# Patient Record
Sex: MALE | Race: OTHER | Employment: UNEMPLOYED | URBAN - NONMETROPOLITAN AREA
[De-identification: names, ages, dates, MRNs, and addresses within clinical notes are randomized per-mention and may not be internally consistent; named-entity substitution may affect disease eponyms.]

---

## 2023-12-01 ENCOUNTER — HOSPITAL ENCOUNTER (OUTPATIENT)
Age: 28
Discharge: HOME | End: 2023-12-01
Payer: MEDICAID

## 2023-12-01 PROCEDURE — 86480 TB TEST CELL IMMUN MEASURE: CPT

## 2023-12-05 LAB
GAMMA INTERFERON BACKGROUND BLD IA-ACNC: 0.53 IU/ML
M TB IFN-G BLD-IMP: POSITIVE
M TB IFN-G CD4+ BCKGRND COR BLD-ACNC: 2.56 IU/ML (ref 0–0.34)
M TB IFN-G CD4+CD8+ BCKGRND COR BLD-ACNC: 3.42 IU/ML (ref 0–0.34)
MITOGEN IGNF BCKGRD COR BLD-ACNC: > 10 IU/ML

## 2023-12-12 ENCOUNTER — HOSPITAL ENCOUNTER (OUTPATIENT)
Age: 28
Discharge: HOME | End: 2023-12-12
Payer: MEDICAID

## 2023-12-12 ENCOUNTER — HOSPITAL ENCOUNTER (OUTPATIENT)
Dept: GENERAL RADIOLOGY | Age: 28
Discharge: HOME | End: 2023-12-12
Payer: MEDICAID

## 2023-12-12 DIAGNOSIS — Z20.1 TUBERCULOSIS EXPOSURE: ICD-10-CM

## 2023-12-12 PROCEDURE — 71045 X-RAY EXAM CHEST 1 VIEW: CPT

## 2023-12-20 ENCOUNTER — OFFICE VISIT (OUTPATIENT)
Dept: FAMILY MEDICINE CLINIC | Age: 28
End: 2023-12-20
Payer: MEDICAID

## 2023-12-20 VITALS
OXYGEN SATURATION: 98 % | HEIGHT: 70 IN | HEART RATE: 71 BPM | TEMPERATURE: 98.2 F | RESPIRATION RATE: 12 BRPM | DIASTOLIC BLOOD PRESSURE: 86 MMHG | SYSTOLIC BLOOD PRESSURE: 128 MMHG | BODY MASS INDEX: 22.05 KG/M2 | WEIGHT: 154 LBS

## 2023-12-20 DIAGNOSIS — Z79.899 HIGH RISK MEDICATION USE: ICD-10-CM

## 2023-12-20 DIAGNOSIS — Z11.4 ENCOUNTER FOR SCREENING FOR HIV: ICD-10-CM

## 2023-12-20 DIAGNOSIS — Z22.7 LATENT TUBERCULOSIS: ICD-10-CM

## 2023-12-20 DIAGNOSIS — R21 RASH AND NONSPECIFIC SKIN ERUPTION: Primary | ICD-10-CM

## 2023-12-20 PROCEDURE — 99214 OFFICE O/P EST MOD 30 MIN: CPT

## 2023-12-20 RX ORDER — BENZOCAINE/MENTHOL 6 MG-10 MG
LOZENGE MUCOUS MEMBRANE
Qty: 30 G | Refills: 1 | Status: SHIPPED | OUTPATIENT
Start: 2023-12-20 | End: 2023-12-27

## 2023-12-20 RX ORDER — RIFAMPIN 300 MG/1
600 CAPSULE ORAL DAILY
Qty: 180 CAPSULE | Refills: 0 | Status: SHIPPED | OUTPATIENT
Start: 2023-12-20 | End: 2024-03-19

## 2023-12-20 RX ORDER — ISONIAZID 300 MG/1
300 TABLET ORAL DAILY
Qty: 90 TABLET | Refills: 0 | Status: SHIPPED | OUTPATIENT
Start: 2023-12-20 | End: 2024-03-19

## 2023-12-20 NOTE — PROGRESS NOTES
Pharmacy Drug Information Request Note      Provided drug information on the recommended treatments for latent TB by request of the care team. Reviewed indication, dosing, directions and monitoring parameters with resident and attending physician.    Regimens include:  Isoniazid monotherapy of 5 mg/kg (max 300 mg/dose) PO daily for duration of 6-9 months  Rifampin monotherapy of 10 mg/kg (max 600 mg/dose) PO daily for duration of 4 months  Combined Isoniazid + Rifampin at above dosing for duration of 3 months    Isoniazid monitoring parameters:  Baseline and monthly LFTs  Monitor for signs/symptoms of liver injury     Rifampin monitoring parameters:  Baseline Scr, CBC, LFTs and periodic (every 2 to 4 weeks during therapy) LFTs   Periodic monitoring of Scr and CBC in patients with baseline abnormalities  Monitor for signs/symptoms of liver injury     Recommend starting combined regimen of Isoniazid and Rifampin for duration of 3 months at above dosing given therapy is the shortest duration. Also recommend HIV screening with baseline labs. May consider pyridoxine supplementation to prevent peripheral neuropathy with use of Isoniazid.      Mary Mandujano [facility]  12/20/2023 3:34 PM     For Pharmacy Admin Tracking Only    Program: Medical Group  CPA in place:  No  Recommendation Provided To: Provider: 3 via Note to Provider and Verbally to provider  Intervention Detail: Lab(s) Ordered and New Rx: 2, reason: Needs Additional Therapy  Intervention Accepted By: Provider: 3  Gap Closed?: No   Time Spent (min): 20

## 2023-12-20 NOTE — PROGRESS NOTES
S: 28 y.o. male with   Chief Complaint   Patient presents with    New Patient     Decreased appetite       Reviewed hx and ROS in detail with resident prior to exam.  See notes for additional details.     BP Readings from Last 3 Encounters:   12/20/23 128/86     Wt Readings from Last 3 Encounters:   12/20/23 69.9 kg (154 lb)           O: VS:  height is 1.79 m (5' 10.47\") and weight is 69.9 kg (154 lb). His oral temperature is 98.2 °F (36.8 °C). His blood pressure is 128/86 and his pulse is 71. His respiration is 12 and oxygen saturation is 98%.   AAO/NAD, appropriate affect for mood     Diagnosis Orders   1. Rash and nonspecific skin eruption            Latent TB infection- agree with 3 months protocol for INH and rifampin, labs today.  Care navigator assigned for help with medicaid and transportation/lab follow up.       Health Maintenance Due   Topic Date Due    Hepatitis B vaccine (1 of 3 - 3-dose series) Never done    COVID-19 Vaccine (1) Never done    Varicella vaccine (1 of 2 - 2-dose childhood series) Never done    Depression Screen  Never done    HIV screen  Never done    Hepatitis C screen  Never done    DTaP/Tdap/Td vaccine (1 - Tdap) Never done    Flu vaccine (1) Never done         Attending Physician Statement  I have discussed the case, including pertinent history and exam findings with the resident. I also have seen the patient and performed key portions of the examination.  I agree with the documented assessment and plan as documented by the resident.  GC modifier added to this encounter      Katelyn Ann Leopold, MD 12/20/2023 11:02 AM

## 2023-12-20 NOTE — PROGRESS NOTES
[facility] PROFESSIONAL [facility] MEDICINE PRACTICE  [address]  [facility] [address]  Dept: [phone]  Loc: [phone]      Danilo Garza (:  1995) is a 28 y.o. male, here for evaluation of the following chief complaint(s):  New Patient (Decreased appetite)      Please see resident note for full Assessment/Plan, HPI, ROS and PE    ASSESSMENT/PLAN:  1. Rash and nonspecific skin eruption  2. Latent tuberculosis  The following orders have not been finalized:  -     isoniazid (NYDRAZID) 300 MG tablet  -     rifAMPin (RIFADIN) 300 MG capsule  3. High risk medication use  The following orders have not been finalized:  -     Iron-Folate-C-Biot-B12-Zinc 160-1.7 MG TABS  4. 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  BCG as child. Quant positive, CXR negative.   Start combined Isoniazid and rifampin x 3 months with baseline and monthly checks of CBC/CMP.  Multivitamin with folate  HIV testing recommended    Care coordination to assist with compliance and transportation, appreciate assistance.      Vitals:    23 0930   BP: 128/86   Site: Right Upper Arm   Position: Sitting   Cuff Size: Medium Adult   Pulse: 71   Resp: 12   Temp: 98.2 °F (36.8 °C)   TempSrc: Oral   SpO2: 98%   Weight: 69.9 kg (154 lb)   Height: 1.79 m (5' 10.47\")         An electronic signature was used to authenticate this note.    --Derrek Alfonso MD

## 2023-12-20 NOTE — PROGRESS NOTES
Health Maintenance Due   Topic Date Due    Hepatitis B vaccine (1 of 3 - 3-dose series) Never done    COVID-19 Vaccine (1) Never done    Varicella vaccine (1 of 2 - 2-dose childhood series) Never done    Depression Screen  Never done    HIV screen  Never done    Hepatitis C screen  Never done    DTaP/Tdap/Td vaccine (1 - Tdap) Never done    Flu vaccine (1) Never done

## 2023-12-20 NOTE — PATIENT INSTRUCTIONS
Thank you   Thank you for trusting us with your healthcare needs. You may receive a survey regarding today's visit. It would help us out if you would take a few moments to provide your feedback. We value your input.  Please bring in ALL medications BOTTLES, including inhalers, herbal supplements, over the counter, prescribed & non-prescribed medicine. The office would like actual medication bottles and a list.   Please note our OFFICE POLICIES:   A.  While Dr. Ortiz is in [place], you may have a Video Visit with him, as long as you are in the state of [place] or [place].  You must have a PCP listed on your account.  If you do not,you will not be able to have an appointment virtually with him.  B.  Dr. Ortiz is only licensed in [place] and [place].  You must be in one of these states to do a video visit.  C.    If the list below has been completed, PLEASE FAX RECORDS TO OUR OFFICE @ [phone]. Once the records have been received we will update your records at our office:  Health Maintenance Due   Topic Date Due    Hepatitis B vaccine (1 of 3 - 3-dose series) Never done    COVID-19 Vaccine (1) Never done    Varicella vaccine (1 of 2 - 2-dose childhood series) Never done    Depression Screen  Never done    HIV screen  Never done    Hepatitis C screen  Never done    DTaP/Tdap/Td vaccine (1 - Tdap) Never done    Flu vaccine (1) Never done

## 2023-12-21 ENCOUNTER — TELEPHONE (OUTPATIENT)
Dept: FAMILY MEDICINE CLINIC | Age: 28
End: 2023-12-21

## 2023-12-21 NOTE — TELEPHONE ENCOUNTER
Received a call from [facility] outpt pharmacy that pt's insurance (medicaid [place]) will not pay for meds out of state. They said that they need a [employer] form sent over to try and get this patient's meds paid for.     They faxed me the form and the form requires a signature. Also, unsure if pt may need SW or a CC to help with this. Form in mailbox.

## 2023-12-21 NOTE — PROGRESS NOTES
Danilo Garza (:  1995) is a 28 y.o. male,New patient, here for evaluation of the following chief complaint(s):  New Patient (Decreased appetite)         ASSESSMENT/PLAN:  1. Rash and nonspecific skin eruption  -     hydrocortisone (ALA-HERMANN) 1 % cream; Apply topically 2 times daily., Disp-30 g, R-1, Normal  2. Latent tuberculosis  -     CBC with Auto Differential; Future  -     Comprehensive Metabolic Panel; Future  -     rifAMPin (RIFADIN) 300 MG capsule; Take 2 capsules by mouth daily, Disp-180 capsule, R-0Normal  -     isoniazid (NYDRAZID) 300 MG tablet; Take 1 tablet by mouth daily, Disp-90 tablet, R-0Normal  3. High risk medication use  -     CBC with Auto Differential; Future  -     Comprehensive Metabolic Panel; Future  -     Iron-Folate-C-Biot-B12-Zinc 160-1.7 MG TABS; Take 1 tablet by mouth daily, Disp-90 tablet, R-1Normal  4. Encounter for screening for HIV    Spoke with public health no initiation of tb therapy  In setting of social determinants of health would prefer shorter course of therapy.  Will start rifampin 300 QD and Isoniazid 300 QD for 3 mo.  Obtain baseline cbc, CMP.  Pt education given in native language vis a vis interperter.  Red flag symptoms discussed  Need for medical compliance and monthly check up with blood work discusesd.  Hiv test at [facility] per Pt, to obtain records  Start hydrocortisone 1% for nonspecific pruritic rash finding.  Social work contacted given need for insurance, monthly transport to clinic, lab and meds.    Return in about 4 weeks (around 2024) for f/u TB.         Subjective   SUBJECTIVE/OBJECTIVE:  HPI    Pt is a 27 y/o male from [facility] who present for new patient establish care. Pt is Greenlandic creole speaking for which  was used. Pt was seen earlier this month at [facility] for occupational tb testing, pt noted hx of BCG ergo quantaferon gold was used for which test returned positive however CXR

## 2023-12-26 NOTE — TELEPHONE ENCOUNTER
Social work referral. Patient has out of state insurance and meds are not covered.   [facility]erral for [facility].

## 2023-12-28 NOTE — TELEPHONE ENCOUNTER
[facility] out-pt pharmacy called to say that they do not have nor do they keep rifampin stocked. They are asking for an alternative (especially if a cheaper one) be sent. Please advise.

## 2024-01-01 NOTE — TELEPHONE ENCOUNTER
Late entry  Called [facility] to clarify on Dec/28/2023.  Pharmacy did correct, and stated pt was able to get rifampin however NOT the Vitamins.  Discussed with pharmacy to change to whichever formulary Pharmacy had to dispense at low cost to patient which had b12 and folate in setting of anti-TB therapy.  Pharmacy will execute as appropriate and notify pt to  vitamins.    Electronically signed by Derrek Pepper MD on 1/1/2024 at 9:26 AM

## 2024-01-02 NOTE — PROGRESS NOTES
Social work referral - Latent TB michael durbin starting treatment, needs transport, insurance and monthly labwork. Patient also needs help with [place] medicaid. Currently out of state medicaid. Thank you.

## 2024-01-05 ENCOUNTER — CARE COORDINATION (OUTPATIENT)
Dept: CARE COORDINATION | Age: 29
End: 2024-01-05

## 2024-01-05 NOTE — CARE COORDINATION
SW mailed out [employer] in [facility] along with financial application to pt at home address.

## 2024-01-05 NOTE — CARE COORDINATION
[facility] called  services and spoke with  #[phone]. Provided  with needed information and he  left a vm for pt to contact local [facility] to have Medicaid switched to [place]. Provided pt with phone # and address to [facility] per .

## 2024-01-22 ENCOUNTER — OFFICE VISIT (OUTPATIENT)
Dept: FAMILY MEDICINE CLINIC | Age: 29
End: 2024-01-22
Payer: MEDICAID

## 2024-01-22 ENCOUNTER — NURSE ONLY (OUTPATIENT)
Dept: LAB | Age: 29
End: 2024-01-22

## 2024-01-22 VITALS
HEIGHT: 70 IN | BODY MASS INDEX: 21.88 KG/M2 | OXYGEN SATURATION: 98 % | RESPIRATION RATE: 12 BRPM | SYSTOLIC BLOOD PRESSURE: 118 MMHG | HEART RATE: 62 BPM | WEIGHT: 152.8 LBS | DIASTOLIC BLOOD PRESSURE: 72 MMHG | TEMPERATURE: 98.4 F

## 2024-01-22 DIAGNOSIS — Z22.7 LATENT TUBERCULOSIS: ICD-10-CM

## 2024-01-22 DIAGNOSIS — Z22.7 LATENT TUBERCULOSIS: Primary | ICD-10-CM

## 2024-01-22 DIAGNOSIS — L28.2 PRURITIC RASH: ICD-10-CM

## 2024-01-22 DIAGNOSIS — Z13.31 DEPRESSION SCREENING: ICD-10-CM

## 2024-01-22 LAB
ALBUMIN SERPL BCG-MCNC: 4.5 G/DL (ref 3.5–5.1)
ALP SERPL-CCNC: 109 U/L (ref 38–126)
ALT SERPL W/O P-5'-P-CCNC: 13 U/L (ref 11–66)
ANION GAP SERPL CALC-SCNC: 12 MEQ/L (ref 8–16)
AST SERPL-CCNC: 23 U/L (ref 5–40)
BASOPHILS ABSOLUTE: 0 THOU/MM3 (ref 0–0.1)
BASOPHILS NFR BLD AUTO: 0.6 %
BILIRUB SERPL-MCNC: 0.3 MG/DL (ref 0.3–1.2)
BUN SERPL-MCNC: 10 MG/DL (ref 7–22)
CALCIUM SERPL-MCNC: 9.1 MG/DL (ref 8.5–10.5)
CHLORIDE SERPL-SCNC: 105 MEQ/L (ref 98–111)
CO2 SERPL-SCNC: 25 MEQ/L (ref 23–33)
CREAT SERPL-MCNC: 1 MG/DL (ref 0.4–1.2)
DEPRECATED RDW RBC AUTO: 41.1 FL (ref 35–45)
EOSINOPHIL NFR BLD AUTO: 2.7 %
EOSINOPHILS ABSOLUTE: 0.1 THOU/MM3 (ref 0–0.4)
ERYTHROCYTE [DISTWIDTH] IN BLOOD BY AUTOMATED COUNT: 12.6 % (ref 11.5–14.5)
GFR SERPL CREATININE-BSD FRML MDRD: > 60 ML/MIN/1.73M2
GLUCOSE FASTING: 84 MG/DL (ref 70–108)
HCT VFR BLD AUTO: 46.2 % (ref 42–52)
HGB BLD-MCNC: 15.2 GM/DL (ref 14–18)
IMM GRANULOCYTES # BLD AUTO: 0.01 THOU/MM3 (ref 0–0.07)
IMM GRANULOCYTES NFR BLD AUTO: 0.2 %
LYMPHOCYTES ABSOLUTE: 2.5 THOU/MM3 (ref 1–4.8)
LYMPHOCYTES NFR BLD AUTO: 52.4 %
MCH RBC QN AUTO: 29.8 PG (ref 26–33)
MCHC RBC AUTO-ENTMCNC: 32.9 GM/DL (ref 32.2–35.5)
MCV RBC AUTO: 90.6 FL (ref 80–94)
MONOCYTES ABSOLUTE: 0.5 THOU/MM3 (ref 0.4–1.3)
MONOCYTES NFR BLD AUTO: 9.4 %
NEUTROPHILS NFR BLD AUTO: 34.7 %
NRBC BLD AUTO-RTO: 0 /100 WBC
PLATELET # BLD AUTO: 283 THOU/MM3 (ref 130–400)
PMV BLD AUTO: 10.7 FL (ref 9.4–12.4)
POTASSIUM SERPL-SCNC: 4.2 MEQ/L (ref 3.5–5.2)
PROT SERPL-MCNC: 8 G/DL (ref 6.1–8)
RBC # BLD AUTO: 5.1 MILL/MM3 (ref 4.7–6.1)
SEGMENTED NEUTROPHILS ABSOLUTE COUNT: 1.7 THOU/MM3 (ref 1.8–7.7)
SODIUM SERPL-SCNC: 142 MEQ/L (ref 135–145)
WBC # BLD AUTO: 4.8 THOU/MM3 (ref 4.8–10.8)

## 2024-01-22 PROCEDURE — G0444 DEPRESSION SCREEN ANNUAL: HCPCS

## 2024-01-22 PROCEDURE — 99213 OFFICE O/P EST LOW 20 MIN: CPT

## 2024-01-22 RX ORDER — PYRIDOXINE HCL (VITAMIN B6) 50 MG
100 TABLET ORAL DAILY
Qty: 120 TABLET | Refills: 0 | Status: SHIPPED | OUTPATIENT
Start: 2024-01-22 | End: 2024-03-22

## 2024-01-22 RX ORDER — TRIAMCINOLONE ACETONIDE 1 MG/G
2 CREAM TOPICAL 2 TIMES DAILY
Qty: 45 G | Refills: 0 | Status: SHIPPED | OUTPATIENT
Start: 2024-01-22

## 2024-01-22 RX ORDER — ISONIAZID 300 MG/1
300 TABLET ORAL DAILY
Qty: 60 TABLET | Refills: 0 | Status: SHIPPED | OUTPATIENT
Start: 2024-01-22 | End: 2024-03-22

## 2024-01-22 RX ORDER — RIFAMPIN 300 MG/1
600 CAPSULE ORAL DAILY
Qty: 120 CAPSULE | Refills: 0 | Status: SHIPPED | OUTPATIENT
Start: 2024-01-22 | End: 2024-03-22

## 2024-01-22 ASSESSMENT — PATIENT HEALTH QUESTIONNAIRE - PHQ9
1. LITTLE INTEREST OR PLEASURE IN DOING THINGS: 0
2. FEELING DOWN, DEPRESSED OR HOPELESS: 0
SUM OF ALL RESPONSES TO PHQ9 QUESTIONS 1 & 2: 0
SUM OF ALL RESPONSES TO PHQ QUESTIONS 1-9: 0

## 2024-01-22 ASSESSMENT — ENCOUNTER SYMPTOMS
SHORTNESS OF BREATH: 0
DIARRHEA: 0
COUGH: 0

## 2024-01-22 NOTE — PROGRESS NOTES
S: 28 y.o. male with   Chief Complaint   Patient presents with    1 Month Follow-Up     TB     Closely following here for latent TB for therapy.  [place] citizen-creole.  Pharmacy refill confusion/troubles.     Back rash, comes and goes. Itching. 2 months. No rash now.    BP Readings from Last 3 Encounters:   01/22/24 118/72   12/20/23 128/86     Wt Readings from Last 3 Encounters:   01/22/24 69.3 kg (152 lb 12.8 oz)   12/20/23 69.9 kg (154 lb)     O: VS:   Vitals:    01/22/24 1000   BP: 118/72   Site: Left Upper Arm   Position: Sitting   Cuff Size: Medium Adult   Pulse: 62   Resp: 12   Temp: 98.4 °F (36.9 °C)   TempSrc: Oral   SpO2: 98%   Weight: 69.3 kg (152 lb 12.8 oz)   Height: 1.79 m (5' 10.47\")     Body mass index is 21.63 kg/m².    AAO/NAD, appropriate affect for mood  Normocephalic, atraumatic, eyes - conjunctiva and sclera normal,   skin no rashes on exposed areas   Insight, judgement normal and in no acute distress    Lab Results   Component Value Date    WBC 4.5 (L) 12/20/2023    HGB 14.8 12/20/2023    HCT 44.6 12/20/2023     12/20/2023    AST 29 12/20/2023     12/20/2023    K 4.2 12/20/2023     12/20/2023    CREATININE 1.1 12/20/2023    BUN 11 12/20/2023    CO2 27 12/20/2023    LABGLOM >60 12/20/2023    CALCIUM 9.6 12/20/2023       No results found.       Diagnosis Orders   1. Latent tuberculosis  rifAMPin (RIFADIN) 300 MG capsule    isoniazid (NYDRAZID) 300 MG tablet    pyridoxine (RA VITAMIN B-6) 50 MG tablet    CBC with Auto Differential    Comprehensive Metabolic Panel, Fasting      2. Depression screening  SC DEPRESSION SCREEN ANNUAL          Plan    Clarification/education given.  Refills given  Rash nonspecific per resident.        Orders Placed:  Orders Placed This Encounter   Procedures    CBC with Auto Differential    Comprehensive Metabolic Panel, Fasting    SC DEPRESSION SCREEN ANNUAL     Medications Prescribed:  Orders Placed This Encounter   Medications    rifAMPin (RIFADIN) 300

## 2024-01-22 NOTE — PROGRESS NOTES
Brief Resident Attestation Note  Please refer to the main resident's note from today for full complete progress note.  Below is a brief note from a senior resident.    Patient:Danilo Garza  YOB: 1995   MRN:[id number]    Subjective   28 y.o. male who presents for the followin Month Follow-Up (TB)    Latent TB stable; no issues. Needs refills for treatment.    Rash  This is a recurrent problem. The current episode started 1 to 4 weeks ago. The problem has been waxing and waning since onset. The affected locations include the back. The rash is characterized by redness and itchiness. He was exposed to nothing. Pertinent negatives include no cough, diarrhea, fever or shortness of breath. Past treatments include nothing.     Review of Systems   Constitutional:  Negative for fever.   Respiratory:  Negative for cough and shortness of breath.    Gastrointestinal:  Negative for diarrhea.   Skin:  Positive for rash.   All other systems reviewed and are negative.    PMHx: He has no past medical history on file.    Objective     Vitals:    24 1000   BP: 118/72   Site: Left Upper Arm   Position: Sitting   Cuff Size: Medium Adult   Pulse: 62   Resp: 12   Temp: 98.4 °F (36.9 °C)   TempSrc: Oral   SpO2: 98%   Weight: 69.3 kg (152 lb 12.8 oz)   Height: 1.79 m (5' 10.47\")    Body mass index is 21.63 kg/m².    Physical Exam: Please see note from the main resident of this encounter    Most Recent Data:  Lab Results   Component Value Date    WBC 4.5 (L) 2023    HGB 14.8 2023    HCT 44.6 2023     2023    ALT 13 2023    AST 29 2023     2023     2023    K 4.2 2023    CREATININE 1.1 2023    BUN 11 2023    CO2 27 2023     BP Readings from Last 3 Encounters:   24 118/72   23 128/86     Wt Readings from Last 3 Encounters:   24 69.3 kg (152 lb 12.8 oz)   23 69.9 kg (154 lb)       There is no immunization

## 2024-01-22 NOTE — PATIENT INSTRUCTIONS
Thank you   Thank you for trusting us with your healthcare needs. You may receive a survey regarding today's visit. It would help us out if you would take a few moments to provide your feedback. We value your input.  Please bring in ALL medications BOTTLES, including inhalers, herbal supplements, over the counter, prescribed & non-prescribed medicine. The office would like actual medication bottles and a list.   Please note our OFFICE POLICIES:   A.  While Dr. Ortiz is in [place], you may have a Video Visit with him, as long as you are in the state of [place] or [place].  You must have a PCP listed on your account.  If you do not,you will not be able to have an appointment virtually with him.  B.  Dr. Ortiz is only licensed in [place] and [place].  You must be in one of these states to do a video visit.  C.  We are unable to change a diagnosis AFTER the test has been performed.          4.  Prior to getting your labs drawn, check with your insurance company for benefits and eligibility of lab services.  Often, insurance companies cover certain tests for preventative visits only.  It is patient's responsibility to    see what is covered.    5.  If the list below has been completed, PLEASE FAX RECORDS TO OUR OFFICE @ [phone]. Once the records have been received we will update your records at our office:  Health Maintenance Due   Topic Date Due    Hepatitis B vaccine (1 of 3 - 3-dose series) Never done    COVID-19 Vaccine (1) Never done    Varicella vaccine (1 of 2 - 2-dose childhood series) Never done    Depression Screen  Never done    HIV screen  Never done    Hepatitis C screen  Never done    DTaP/Tdap/Td vaccine (1 - Tdap) Never done    Flu vaccine (1) Never done

## 2024-01-26 ASSESSMENT — ENCOUNTER SYMPTOMS
CONSTIPATION: 0
NAUSEA: 0
ABDOMINAL PAIN: 0
COUGH: 0
DIARRHEA: 0
STRIDOR: 0
WHEEZING: 0
SHORTNESS OF BREATH: 0
BACK PAIN: 0
VOMITING: 0

## 2024-01-26 NOTE — PROGRESS NOTES
Danilo Garza (:  1995) is a 28 y.o. male,Established patient, here for evaluation of the following chief complaint(s):  1 Month Follow-Up (TB)         ASSESSMENT/PLAN:  1. Latent tuberculosis  -     rifAMPin (RIFADIN) 300 MG capsule; Take 2 capsules by mouth daily, Disp-120 capsule, R-0Normal  -     isoniazid (NYDRAZID) 300 MG tablet; Take 1 tablet by mouth daily, Disp-60 tablet, R-0Normal  -     pyridoxine (RA VITAMIN B-6) 50 MG tablet; Take 2 tablets by mouth daily, Disp-120 tablet, R-0Normal  -     CBC with Auto Differential; Future  -     Comprehensive Metabolic Panel, Fasting; Future  2. Depression screening  -     MO DEPRESSION SCREEN ANNUAL  3. Pruritic rash  -     triamcinolone (KENALOG) 0.1 % cream; Apply 2 g topically 2 times daily Apply topically 2 times daily., Topical, 2 TIMES DAILY Starting Mon 2024, Disp-45 g, R-0, Normal    Continue tb tx 1/3 months completed.  Nonspecific rash on back, start kenalog cream    Return in about 4 weeks (around 2024).         Subjective   SUBJECTIVE/OBJECTIVE:  HPI    Pt is a 27 y/o male from [facility] who present for f/u latent TB. Pt is Camero[place] creole speaking for which  was used. Pt states has been taking abx however has not taken mutlivitamin yet x 1 mo, refills given, labs reviewd, Pt counseled on need to  vitamins. Continues to denies hemoptysis, fever/night sweats. Pt amicable to treatment of TB. Pt notes nonspecific rash on back with noed pruritis, nonspecific       Review of Systems   Constitutional:  Negative for activity change, appetite change, chills, diaphoresis, fatigue and fever.   Respiratory:  Negative for cough, shortness of breath, wheezing and stridor.    Cardiovascular:  Negative for chest pain, palpitations and leg swelling.   Gastrointestinal:  Negative for abdominal pain, constipation, diarrhea, nausea and vomiting.   Genitourinary:  Negative for decreased urine volume, dysuria, flank pain, frequency,

## 2024-02-19 ENCOUNTER — OFFICE VISIT (OUTPATIENT)
Dept: FAMILY MEDICINE CLINIC | Age: 29
End: 2024-02-19
Payer: MEDICAID

## 2024-02-19 VITALS
BODY MASS INDEX: 21.73 KG/M2 | SYSTOLIC BLOOD PRESSURE: 122 MMHG | TEMPERATURE: 98.1 F | HEART RATE: 60 BPM | HEIGHT: 70 IN | WEIGHT: 151.8 LBS | DIASTOLIC BLOOD PRESSURE: 84 MMHG | RESPIRATION RATE: 12 BRPM | OXYGEN SATURATION: 98 %

## 2024-02-19 DIAGNOSIS — R21 RASH AND NONSPECIFIC SKIN ERUPTION: ICD-10-CM

## 2024-02-19 DIAGNOSIS — Z22.7 LATENT TUBERCULOSIS: Primary | ICD-10-CM

## 2024-02-19 DIAGNOSIS — Z79.899 HIGH RISK MEDICATION USE: ICD-10-CM

## 2024-02-19 PROCEDURE — 99214 OFFICE O/P EST MOD 30 MIN: CPT

## 2024-02-19 RX ORDER — TRIAMCINOLONE ACETONIDE 5 MG/G
CREAM TOPICAL
Qty: 15 G | Refills: 0 | Status: SHIPPED | OUTPATIENT
Start: 2024-02-19 | End: 2024-02-26

## 2024-02-19 NOTE — PROGRESS NOTES
Health Maintenance Due   Topic Date Due    Hepatitis B vaccine (1 of 3 - 3-dose series) Never done    COVID-19 Vaccine (1) Never done    Varicella vaccine (1 of 2 - 2-dose childhood series) Never done    HIV screen  Never done    Hepatitis C screen  Never done    DTaP/Tdap/Td vaccine (1 - Tdap) Never done    Flu vaccine (1) Never done

## 2024-02-19 NOTE — PROGRESS NOTES
Danilo Garza (:  1995) is a 28 y.o. male,Established patient, here for evaluation of the following chief complaint(s):  1 Month Follow-Up (Rash that come and goes)         ASSESSMENT/PLAN:  1. Latent tuberculosis  2. Rash and nonspecific skin eruption  -     triamcinolone (ARISTOCORT) 0.5 % cream; Apply topically 2 times daily to affected area back only during acute rash flares for 3-5 days maximum, Disp-15 g, R-0, Normal  3. High risk medication use    Continue TB medications.  Plan for final CMP at next vist  Rash no acute flare but pt is concerned  Previously improved on triam 0.1% Increase to 0.5%  May need  biopsy when acute flare.    Return in about 4 weeks (around 3/18/2024) for f/u latent TB.         Subjective   SUBJECTIVE/OBJECTIVE:  HPI     Pt is a 27 y/o male from [facility] who present for f/u latent TB. Pt is Ukrainian creole speaking for which  was used. Pt states has been taking abx, labs reviewd, Pt counseled on need to  vitamins. Continues to denies hemoptysis, fever/night sweats. Pt amicable to treatment of TB. Pt notes nonspecific rash on back with noted pruritis, nonspecific, improved previously on triamincolone 0.1%, no in acute flare at this time, pt concern and request additional testing given intermittent nature.    Review of Systems     Constitutional:  Negative for activity change, appetite change, chills, diaphoresis, fatigue and fever.   Respiratory:  Negative for cough, shortness of breath, wheezing and stridor.    Cardiovascular:  Negative for chest pain, palpitations and leg swelling.   Gastrointestinal:  Negative for abdominal pain, constipation, diarrhea, nausea and vomiting.   Genitourinary:  Negative for decreased urine volume, dysuria, flank pain, frequency, hematuria and urgency.   Musculoskeletal:  Negative for arthralgias, back pain, gait problem, myalgias, neck pain and neck stiffness.   Skin:  Positive for rash. Negative for pallor and wound.

## 2024-02-19 NOTE — PROGRESS NOTES
S: 28 y.o. male with   Chief Complaint   Patient presents with    1 Month Follow-Up     Rash that come and goes       HPI: please see resident note for HPI and ROS.    BP Readings from Last 3 Encounters:   02/19/24 122/84   01/22/24 118/72   12/20/23 128/86     Wt Readings from Last 3 Encounters:   02/19/24 68.9 kg (151 lb 12.8 oz)   01/22/24 69.3 kg (152 lb 12.8 oz)   12/20/23 69.9 kg (154 lb)       O: VS:  height is 1.79 m (5' 10.47\") and weight is 68.9 kg (151 lb 12.8 oz). His oral temperature is 98.1 °F (36.7 °C). His blood pressure is 122/84 and his pulse is 60. His respiration is 12 and oxygen saturation is 98%.   AAO/NAD, appropriate affect for mood  CV:  RRR, no murmur  Resp: CTAB  Skin: Nonspecific dermatitis rash that is diffuse across back as noted in media tab.     Diagnosis Orders   1. Latent tuberculosis        2. Rash and nonspecific skin eruption  triamcinolone (ARISTOCORT) 0.5 % cream      3. High risk medication use            Plan:  Please refer to resident note for full plan.    28 year old male presents to the office to follow up on latent tuberculosis. Currently on rifampin, isoniazid which is going well.  Will need 1 additional month.  Continue to monitor CBC and LFTs monthly.  Patient does have a rash present on his back which is a nonspecific dermatitis.  Unclear etiology.  Continue triamcinolone as needed for flares.  Could consider biopsy during flare if patient is interested.  Educated about using daily emollient on his back for moisturizer.  Follow-up in 1 month for recheck on TB otherwise no acute concerns.    Health Maintenance Due   Topic Date Due    Hepatitis B vaccine (1 of 3 - 3-dose series) Never done    COVID-19 Vaccine (1) Never done    Varicella vaccine (1 of 2 - 2-dose childhood series) Never done    HIV screen  Never done    Hepatitis C screen  Never done    DTaP/Tdap/Td vaccine (1 - Tdap) Never done    Flu vaccine (1) Never done       Attending Physician Statement  I have

## 2024-02-19 NOTE — PATIENT INSTRUCTIONS
Thank you   Thank you for trusting us with your healthcare needs. You may receive a survey regarding today's visit. It would help us out if you would take a few moments to provide your feedback. We value your input.  Please bring in ALL medications BOTTLES, including inhalers, herbal supplements, over the counter, prescribed & non-prescribed medicine. The office would like actual medication bottles and a list.         4.  Prior to getting your labs drawn, check with your insurance company for benefits and eligibility of lab services.  Often, insurance companies cover certain tests for preventative visits only.  It is patient's responsibility to    see what is covered.    5.  If the list below has been completed, PLEASE FAX RECORDS TO OUR OFFICE @ [phone]. Once the records have been received we will update your records at our office:  Health Maintenance Due   Topic Date Due    Hepatitis B vaccine (1 of 3 - 3-dose series) Never done    COVID-19 Vaccine (1) Never done    Varicella vaccine (1 of 2 - 2-dose childhood series) Never done    HIV screen  Never done    Hepatitis C screen  Never done    DTaP/Tdap/Td vaccine (1 - Tdap) Never done    Flu vaccine (1) Never done

## 2024-04-02 ENCOUNTER — OFFICE VISIT (OUTPATIENT)
Dept: FAMILY MEDICINE CLINIC | Age: 29
End: 2024-04-02

## 2024-04-02 ENCOUNTER — HOSPITAL ENCOUNTER (OUTPATIENT)
Age: 29
Discharge: HOME | End: 2024-04-02
Payer: MEDICAID

## 2024-04-02 VITALS
TEMPERATURE: 98.1 F | BODY MASS INDEX: 21.24 KG/M2 | WEIGHT: 148.4 LBS | OXYGEN SATURATION: 99 % | DIASTOLIC BLOOD PRESSURE: 78 MMHG | SYSTOLIC BLOOD PRESSURE: 116 MMHG | RESPIRATION RATE: 21 BRPM | HEART RATE: 64 BPM | HEIGHT: 70 IN

## 2024-04-02 DIAGNOSIS — R21 RASH AND NONSPECIFIC SKIN ERUPTION: ICD-10-CM

## 2024-04-02 DIAGNOSIS — Z22.7 LATENT TUBERCULOSIS: Primary | ICD-10-CM

## 2024-04-02 DIAGNOSIS — Z22.7 LATENT TUBERCULOSIS: ICD-10-CM

## 2024-04-02 DIAGNOSIS — Z79.899 HIGH RISK MEDICATION USE: ICD-10-CM

## 2024-04-02 LAB
ALBUMIN SERPL BCG-MCNC: 4.6 G/DL (ref 3.5–5.1)
ALP SERPL-CCNC: 99 U/L (ref 38–126)
ALT SERPL W/O P-5'-P-CCNC: 19 U/L (ref 11–66)
ANION GAP SERPL CALC-SCNC: 9 MEQ/L (ref 8–16)
AST SERPL-CCNC: 28 U/L (ref 5–40)
BASOPHILS ABSOLUTE: 0 THOU/MM3 (ref 0–0.1)
BASOPHILS NFR BLD AUTO: 0.6 %
BILIRUB SERPL-MCNC: 0.3 MG/DL (ref 0.3–1.2)
BUN SERPL-MCNC: 11 MG/DL (ref 7–22)
CALCIUM SERPL-MCNC: 9.3 MG/DL (ref 8.5–10.5)
CHLORIDE SERPL-SCNC: 101 MEQ/L (ref 98–111)
CO2 SERPL-SCNC: 27 MEQ/L (ref 23–33)
CREAT SERPL-MCNC: 0.9 MG/DL (ref 0.4–1.2)
DEPRECATED RDW RBC AUTO: 41.4 FL (ref 35–45)
EOSINOPHIL NFR BLD AUTO: 2.6 %
EOSINOPHILS ABSOLUTE: 0.2 THOU/MM3 (ref 0–0.4)
ERYTHROCYTE [DISTWIDTH] IN BLOOD BY AUTOMATED COUNT: 12.7 % (ref 11.5–14.5)
GFR SERPL CREATININE-BSD FRML MDRD: > 90 ML/MIN/1.73M2
GLUCOSE FASTING: 93 MG/DL (ref 70–108)
HCT VFR BLD AUTO: 45.9 % (ref 42–52)
HGB BLD-MCNC: 15.7 GM/DL (ref 14–18)
IMM GRANULOCYTES # BLD AUTO: 0.01 THOU/MM3 (ref 0–0.07)
IMM GRANULOCYTES NFR BLD AUTO: 0.2 %
LYMPHOCYTES ABSOLUTE: 2.7 THOU/MM3 (ref 1–4.8)
LYMPHOCYTES NFR BLD AUTO: 40.4 %
MCH RBC QN AUTO: 30.9 PG (ref 26–33)
MCHC RBC AUTO-ENTMCNC: 34.2 GM/DL (ref 32.2–35.5)
MCV RBC AUTO: 90.4 FL (ref 80–94)
MONOCYTES ABSOLUTE: 0.6 THOU/MM3 (ref 0.4–1.3)
MONOCYTES NFR BLD AUTO: 8.9 %
NEUTROPHILS NFR BLD AUTO: 47.3 %
NRBC BLD AUTO-RTO: 0 /100 WBC
PLATELET # BLD AUTO: 238 THOU/MM3 (ref 130–400)
PMV BLD AUTO: 10.3 FL (ref 9.4–12.4)
POTASSIUM SERPL-SCNC: 4.5 MEQ/L (ref 3.5–5.2)
PROT SERPL-MCNC: 8.3 G/DL (ref 6.1–8)
RBC # BLD AUTO: 5.08 MILL/MM3 (ref 4.7–6.1)
SEGMENTED NEUTROPHILS ABSOLUTE COUNT: 3.1 THOU/MM3 (ref 1.8–7.7)
SODIUM SERPL-SCNC: 137 MEQ/L (ref 135–145)
WBC # BLD AUTO: 6.6 THOU/MM3 (ref 4.8–10.8)

## 2024-04-02 PROCEDURE — 85025 COMPLETE CBC W/AUTO DIFF WBC: CPT

## 2024-04-02 PROCEDURE — 36415 COLL VENOUS BLD VENIPUNCTURE: CPT

## 2024-04-02 PROCEDURE — 80053 COMPREHEN METABOLIC PANEL: CPT

## 2024-04-02 RX ORDER — TRIAMCINOLONE ACETONIDE 1 MG/G
CREAM TOPICAL
Qty: 45 G | Refills: 3 | Status: SHIPPED | OUTPATIENT
Start: 2024-04-02

## 2024-04-02 SDOH — ECONOMIC STABILITY: HOUSING INSECURITY
IN THE LAST 12 MONTHS, WAS THERE A TIME WHEN YOU DID NOT HAVE A STEADY PLACE TO SLEEP OR SLEPT IN A SHELTER (INCLUDING NOW)?: NO

## 2024-04-02 SDOH — ECONOMIC STABILITY: FOOD INSECURITY: WITHIN THE PAST 12 MONTHS, THE FOOD YOU BOUGHT JUST DIDN'T LAST AND YOU DIDN'T HAVE MONEY TO GET MORE.: NEVER TRUE

## 2024-04-02 SDOH — ECONOMIC STABILITY: FOOD INSECURITY: WITHIN THE PAST 12 MONTHS, YOU WORRIED THAT YOUR FOOD WOULD RUN OUT BEFORE YOU GOT MONEY TO BUY MORE.: NEVER TRUE

## 2024-04-02 SDOH — ECONOMIC STABILITY: INCOME INSECURITY: HOW HARD IS IT FOR YOU TO PAY FOR THE VERY BASICS LIKE FOOD, HOUSING, MEDICAL CARE, AND HEATING?: NOT VERY HARD

## 2024-04-04 NOTE — PROGRESS NOTES
S: 28 y.o. male with   Chief Complaint   Patient presents with    Follow-up     [facility]       Chief complaint, [place], and all pertinent details of the case reviewed with the resident.    Please see resident's note for specific details discussed at today's visit.  Has finished his TB meds.  Requests a circumcision because he is a Judaism.  Using  for [place] [employer].  BP Readings from Last 3 Encounters:   04/02/24 116/78   02/19/24 122/84   01/22/24 118/72     Wt Readings from Last 3 Encounters:   04/02/24 67.3 kg (148 lb 6.4 oz)   02/19/24 68.9 kg (151 lb 12.8 oz)   01/22/24 69.3 kg (152 lb 12.8 oz)       O: VS:  height is 1.79 m (5' 10.47\") and weight is 67.3 kg (148 lb 6.4 oz). His oral temperature is 98.1 °F (36.7 °C). His blood pressure is 116/78 and his pulse is 64. His respiration is 21 and oxygen saturation is 99%.   AAO/NAD, appropriate affect for mood  A:Finished Latent TB txInterested in circumcision only for Scientologist reasons     Diagnosis Orders   1. Latent tuberculosis        2. High risk medication use            Plan:Will recheck cbc, cmp  Reassure pt that if he is not having physical problems with foreskin, circumcision is not longer necessary for him       Health Maintenance Due   Topic Date Due    Hepatitis B vaccine (1 of 3 - 3-dose series) Never done    COVID-19 Vaccine (1) Never done    Varicella vaccine (1 of 2 - 2-dose childhood series) Never done    HIV screen  Never done    Hepatitis C screen  Never done    DTaP/Tdap/Td vaccine (1 - Tdap) Never done       Attending Physician Statement  I have discussed the case, including pertinent history and exam findings with the resident. I also have seen the patient and performed key portions of the examination.  I agree with the documented assessment and plan as documented by the resident.        Conchis Mackay MD 4/2/2024 9:36 AM  
distension.      Palpations: Abdomen is soft.      Tenderness: There is no abdominal tenderness. There is no guarding or rebound.   Musculoskeletal:         General: No swelling, tenderness or signs of injury.      Right lower leg: No edema.      Left lower leg: No edema.   Skin:     General: Skin is warm and dry.      Capillary Refill: Capillary refill takes less than 2 seconds.      Coloration: Skin is not jaundiced or pale.      Findings: No rash, erythema or lesion.   Neurological:      General: No focal deficit present.      Mental Status: He is alert and oriented to person, place, and time.      Cranial Nerves: No cranial nerve deficit.      Sensory: No sensory deficit.      Motor: No weakness.     On this date 4/2/2024 I have spent 30 minutes reviewing previous notes, test results and face to face with the patient discussing the diagnosis and importance of compliance with the treatment plan as well as documenting on the day of the visit.      An electronic signature was used to authenticate this note.    --Derrek Pepper MD   
Abdomen soft, nontender, nondistended, bowel sounds present in all 4 quadrants.

## 2024-10-14 DIAGNOSIS — R21 RASH AND NONSPECIFIC SKIN ERUPTION: ICD-10-CM

## 2024-10-14 RX ORDER — TRIAMCINOLONE ACETONIDE 1 MG/G
CREAM TOPICAL
Qty: 45 G | Refills: 0 | Status: SHIPPED | OUTPATIENT
Start: 2024-10-14

## 2024-11-21 ENCOUNTER — OFFICE VISIT (OUTPATIENT)
Dept: FAMILY MEDICINE CLINIC | Age: 29
End: 2024-11-21
Payer: MEDICAID

## 2024-11-21 VITALS
OXYGEN SATURATION: 96 % | DIASTOLIC BLOOD PRESSURE: 68 MMHG | RESPIRATION RATE: 12 BRPM | SYSTOLIC BLOOD PRESSURE: 122 MMHG | HEIGHT: 70 IN | TEMPERATURE: 98.2 F | HEART RATE: 53 BPM | WEIGHT: 138.8 LBS | BODY MASS INDEX: 19.87 KG/M2

## 2024-11-21 DIAGNOSIS — Z13.1 SCREENING FOR DIABETES MELLITUS: ICD-10-CM

## 2024-11-21 DIAGNOSIS — Z76.89 ENCOUNTER TO ESTABLISH CARE WITH NEW DOCTOR: ICD-10-CM

## 2024-11-21 DIAGNOSIS — R30.0 DYSURIA: Primary | ICD-10-CM

## 2024-11-21 DIAGNOSIS — Z13.6 ENCOUNTER FOR LIPID SCREENING FOR CARDIOVASCULAR DISEASE: ICD-10-CM

## 2024-11-21 DIAGNOSIS — Z13.220 ENCOUNTER FOR LIPID SCREENING FOR CARDIOVASCULAR DISEASE: ICD-10-CM

## 2024-11-21 DIAGNOSIS — Z11.4 ENCOUNTER FOR SCREENING FOR HIV: ICD-10-CM

## 2024-11-21 DIAGNOSIS — Z53.1 RELIGIOUS OR SPIRITUAL BELIEFS AFFECTING MEDICAL CARE: ICD-10-CM

## 2024-11-21 DIAGNOSIS — Z11.59 ENCOUNTER FOR HEPATITIS C SCREENING TEST FOR LOW RISK PATIENT: ICD-10-CM

## 2024-11-21 DIAGNOSIS — R21 RASH AND NONSPECIFIC SKIN ERUPTION: ICD-10-CM

## 2024-11-21 PROCEDURE — 99214 OFFICE O/P EST MOD 30 MIN: CPT

## 2024-11-21 RX ORDER — TRIAMCINOLONE ACETONIDE 1 MG/G
CREAM TOPICAL
Qty: 45 G | Refills: 0 | Status: SHIPPED | OUTPATIENT
Start: 2024-11-21

## 2024-11-21 NOTE — PROGRESS NOTES
Patient has been applying the cream every day after taking a shower all over his body. States that his skin itches but not just after a shower, all throughout the day  - Does not use a moisturizer  Dysuria  - Burning started 1 week ago when peeing. Has improved slightly since then  - No increased frequency and no itching. No discharge or bleeding. No lesions or sores or warts on genitals. No constipation or diarrhea   - Currently sexually active, last intercourse was 5 months ago  - Drinks a lot of water   - Interested in circumcision for Syn[employer] beliefs. States he read in the Bible that it is a sin not to be circumcised. Discussed the meaning of Evangelical in similar light of circumcision. Patient states this has been discussed with him previously but he is still thinking about it  H/o Latent TB  - Completed treatment a couple months ago  - No shortness of breath     Chronic Medical Conditions  Latent TB: [facility] with positive Quantiferon but negative CXR. Had BCG as a child. Social work following. Started treatment with Isoniazid and Rifampin in December 2023 for three months with plan for monthly CBC and CMP as well as MVI+Folate supplementation and completed treatment in April   Prurituc Rash: Prior treatments include Hydrocortisone cream, Kenalog cream (0.1% and 0.5%). Consider biopsy with future flares     Review of Systems   Constitutional:  Negative for chills.   Respiratory:  Negative for shortness of breath.    Genitourinary:  Positive for dysuria. Negative for difficulty urinating, frequency, genital sores, hematuria, penile discharge and urgency.   Skin:  Negative for color change, rash and wound.     Patient History   Past Medical History   has no past medical history on file.    Family History  No family history on file.    Past Surgical History  No past surgical history on file.     Social History   reports that he has never smoked. He has never been exposed to tobacco

## 2024-11-22 ASSESSMENT — ENCOUNTER SYMPTOMS
COLOR CHANGE: 0
SHORTNESS OF BREATH: 0